# Patient Record
Sex: MALE | URBAN - METROPOLITAN AREA
[De-identification: names, ages, dates, MRNs, and addresses within clinical notes are randomized per-mention and may not be internally consistent; named-entity substitution may affect disease eponyms.]

---

## 2022-03-10 ENCOUNTER — APPOINTMENT (OUTPATIENT)
Dept: URBAN - METROPOLITAN AREA CLINIC 250 | Age: 82
Setting detail: DERMATOLOGY
End: 2022-03-13

## 2022-03-10 DIAGNOSIS — L28.1 PRURIGO NODULARIS: ICD-10-CM

## 2022-03-10 DIAGNOSIS — L21.8 OTHER SEBORRHEIC DERMATITIS: ICD-10-CM

## 2022-03-10 DIAGNOSIS — L82.0 INFLAMED SEBORRHEIC KERATOSIS: ICD-10-CM

## 2022-03-10 DIAGNOSIS — L29.8 OTHER PRURITUS: ICD-10-CM

## 2022-03-10 PROCEDURE — 17110 DESTRUCT B9 LESION 1-14: CPT

## 2022-03-10 PROCEDURE — OTHER PRESCRIPTION: OTHER

## 2022-03-10 PROCEDURE — OTHER PRESCRIPTION MEDICATION MANAGEMENT: OTHER

## 2022-03-10 PROCEDURE — OTHER MIPS QUALITY: OTHER

## 2022-03-10 PROCEDURE — 99214 OFFICE O/P EST MOD 30 MIN: CPT | Mod: 25

## 2022-03-10 PROCEDURE — OTHER COUNSELING: OTHER

## 2022-03-10 PROCEDURE — OTHER LIQUID NITROGEN: OTHER

## 2022-03-10 RX ORDER — TRIAMCINOLONE ACETONIDE 0.25 MG/G
CREAM TOPICAL
Qty: 80 | Refills: 1 | Status: ERX | COMMUNITY
Start: 2022-03-10

## 2022-03-10 RX ORDER — KETOCONAZOLE 20 MG/G
CREAM TOPICAL
Qty: 60 | Refills: 11 | Status: ERX | COMMUNITY
Start: 2022-03-10

## 2022-03-10 ASSESSMENT — LOCATION DETAILED DESCRIPTION DERM
LOCATION DETAILED: LEFT SUPERIOR PARIETAL SCALP
LOCATION DETAILED: LEFT INFERIOR UPPER BACK
LOCATION DETAILED: LEFT CENTRAL MALAR CHEEK
LOCATION DETAILED: RIGHT SUPERIOR LATERAL UPPER BACK
LOCATION DETAILED: LEFT SUPERIOR UPPER BACK
LOCATION DETAILED: RIGHT SUPERIOR UPPER BACK
LOCATION DETAILED: LEFT INFERIOR LATERAL MALAR CHEEK
LOCATION DETAILED: LEFT MID-UPPER BACK
LOCATION DETAILED: LEFT SUPERIOR LATERAL UPPER BACK
LOCATION DETAILED: RIGHT MEDIAL INFERIOR CHEST

## 2022-03-10 ASSESSMENT — LOCATION SIMPLE DESCRIPTION DERM
LOCATION SIMPLE: SCALP
LOCATION SIMPLE: LEFT CHEEK
LOCATION SIMPLE: LEFT UPPER BACK
LOCATION SIMPLE: CHEST
LOCATION SIMPLE: RIGHT UPPER BACK

## 2022-03-10 ASSESSMENT — LOCATION ZONE DERM
LOCATION ZONE: FACE
LOCATION ZONE: TRUNK
LOCATION ZONE: SCALP

## 2022-03-10 NOTE — PROCEDURE: PRESCRIPTION MEDICATION MANAGEMENT
Detail Level: Simple
Continue Regimen: Continue Triamcinolone 0.025% cream aaa bid PRN itch
Render In Strict Bullet Format?: No
Continue Regimen: Continue Triamcinolone 0.025% cream aaa bid PRN
Continue Regimen: Continue Ketoconazole cream 2% aaa bid

## 2022-03-10 NOTE — PROCEDURE: LIQUID NITROGEN
Show Spray Paint Technique Variable?: Yes
Spray Paint Text: The liquid nitrogen was applied to the skin utilizing a spray paint frosting technique.
Spray Paint Technique: No
Post-Care Instructions: I reviewed with the patient in detail post-care instructions. Patient is to wear sunprotection, and avoid picking at any of the treated lesions. Pt may apply Vaseline to crusted or scabbing areas.
Medical Necessity Information: It is in your best interest to select a reason for this procedure from the list below. All of these items fulfill various CMS LCD requirements except the new and changing color options.
Consent: The patient's consent was obtained including but not limited to risks of crusting, scabbing, blistering, scarring, darker or lighter pigmentary change, recurrence, incomplete removal and infection.
Number Of Freeze-Thaw Cycles: 1 freeze-thaw cycle
Duration Of Freeze Thaw-Cycle (Seconds): 5-10
Detail Level: Detailed
Medical Necessity Clause: This procedure was medically necessary because the lesions that were treated were:

## 2022-09-21 ENCOUNTER — APPOINTMENT (OUTPATIENT)
Dept: URBAN - METROPOLITAN AREA CLINIC 250 | Age: 82
Setting detail: DERMATOLOGY
End: 2022-09-21

## 2022-09-21 DIAGNOSIS — L29.8 OTHER PRURITUS: ICD-10-CM

## 2022-09-21 PROCEDURE — OTHER COUNSELING: OTHER

## 2022-09-21 PROCEDURE — OTHER PRESCRIPTION: OTHER

## 2022-09-21 PROCEDURE — OTHER PRESCRIPTION MEDICATION MANAGEMENT: OTHER

## 2022-09-21 PROCEDURE — OTHER MIPS QUALITY: OTHER

## 2022-09-21 PROCEDURE — 99213 OFFICE O/P EST LOW 20 MIN: CPT

## 2022-09-21 RX ORDER — NYSTATIN AND TRIAMCINOLONE ACETONIDE 100000; 1 [USP'U]/G; MG/G
1 OINTMENT TOPICAL TIW
Qty: 30 | Refills: 2 | Status: ERX | COMMUNITY
Start: 2022-09-21

## 2022-09-21 ASSESSMENT — LOCATION DETAILED DESCRIPTION DERM
LOCATION DETAILED: LEFT INFERIOR UPPER BACK
LOCATION DETAILED: GENITALIA

## 2022-09-21 ASSESSMENT — LOCATION SIMPLE DESCRIPTION DERM
LOCATION SIMPLE: LEFT UPPER BACK
LOCATION SIMPLE: GENITALIA

## 2022-09-21 ASSESSMENT — LOCATION ZONE DERM
LOCATION ZONE: TRUNK
LOCATION ZONE: GENITALIA

## 2022-09-21 NOTE — PROCEDURE: PRESCRIPTION MEDICATION MANAGEMENT
Discontinue Regimen: Continue Triamcinolone 0.025% cream aaa bid PRN itch
Detail Level: Simple
Initiate Treatment: nystatin-triamcinolone 100,000 unit/gram-0.1 % topical ointment TIW\\nQuantity: 60.0 g  Days Supply: 30\\nSig: Apply to affected areas of itching three times weekly.
Render In Strict Bullet Format?: No

## 2022-09-21 NOTE — PROCEDURE: MIPS QUALITY
Quality 111:Pneumonia Vaccination Status For Older Adults: Pneumococcal vaccine (PPSV23) administered on or after patient’s 60th birthday and before the end of the measurement period
Quality 110: Preventive Care And Screening: Influenza Immunization: Influenza Immunization previously received during influenza season
Detail Level: Detailed
Quality 226: Preventive Care And Screening: Tobacco Use: Screening And Cessation Intervention: Patient screened for tobacco use and is an ex/non-smoker
Quality 47: Advance Care Plan: Advance Care Planning discussed and documented; advance care plan or surrogate decision maker documented in the medical record.
Quality 130: Documentation Of Current Medications In The Medical Record: Current Medications Documented

## 2023-09-21 ENCOUNTER — APPOINTMENT (OUTPATIENT)
Dept: URBAN - METROPOLITAN AREA CLINIC 250 | Age: 83
Setting detail: DERMATOLOGY
End: 2023-09-23

## 2023-09-21 DIAGNOSIS — L29.8 OTHER PRURITUS: ICD-10-CM

## 2023-09-21 PROCEDURE — OTHER PRESCRIPTION: OTHER

## 2023-09-21 PROCEDURE — 99213 OFFICE O/P EST LOW 20 MIN: CPT

## 2023-09-21 PROCEDURE — OTHER PRESCRIPTION MEDICATION MANAGEMENT: OTHER

## 2023-09-21 PROCEDURE — OTHER COUNSELING: OTHER

## 2023-09-21 PROCEDURE — OTHER MIPS QUALITY: OTHER

## 2023-09-21 RX ORDER — NYSTATIN AND TRIAMCINOLONE ACETONIDE 100000; 1 [USP'U]/G; MG/G
1 OINTMENT TOPICAL TIW
Qty: 90 | Refills: 0 | Status: ERX | COMMUNITY
Start: 2023-09-21

## 2023-09-21 ASSESSMENT — LOCATION ZONE DERM
LOCATION ZONE: TRUNK
LOCATION ZONE: GENITALIA

## 2023-09-21 ASSESSMENT — LOCATION SIMPLE DESCRIPTION DERM
LOCATION SIMPLE: GENITALIA
LOCATION SIMPLE: LEFT UPPER BACK

## 2023-09-21 ASSESSMENT — LOCATION DETAILED DESCRIPTION DERM
LOCATION DETAILED: GENITALIA
LOCATION DETAILED: LEFT INFERIOR UPPER BACK

## 2023-09-21 NOTE — PROCEDURE: MIPS QUALITY
Quality 110: Preventive Care And Screening: Influenza Immunization: Influenza Immunization previously received during influenza season
Detail Level: Detailed
Quality 226: Preventive Care And Screening: Tobacco Use: Screening And Cessation Intervention: Patient screened for tobacco use and is an ex/non-smoker
Quality 47: Advance Care Plan: Advance Care Planning discussed and documented; advance care plan or surrogate decision maker documented in the medical record.
Quality 431: Preventive Care And Screening: Unhealthy Alcohol Use - Screening: Patient not identified as an unhealthy alcohol user when screened for unhealthy alcohol use using a systematic screening method
Quality 130: Documentation Of Current Medications In The Medical Record: Current Medications Documented

## 2023-09-21 NOTE — PROCEDURE: PRESCRIPTION MEDICATION MANAGEMENT
Detail Level: Simple
Continue Regimen: nystatin-triamcinolone 100,000 unit/gram-0.1 % topical ointment TIW\\nQuantity: 60.0 g  Days Supply: 30\\nSig: Apply to affected areas of itching three times weekly.
Render In Strict Bullet Format?: No

## 2023-10-02 ENCOUNTER — LAB REQUISITION (OUTPATIENT)
Dept: LAB | Age: 83
End: 2023-10-02

## 2023-10-02 DIAGNOSIS — R97.20 ELEVATED PROSTATE SPECIFIC ANTIGEN (PSA): ICD-10-CM

## 2023-10-02 PROCEDURE — 84153 ASSAY OF PSA TOTAL: CPT | Performed by: CLINICAL MEDICAL LABORATORY

## 2023-10-03 LAB — PSA SERPL-MCNC: 0.76 NG/ML

## 2023-12-07 ENCOUNTER — TELEPHONE (OUTPATIENT)
Dept: OTHER | Age: 83
End: 2023-12-07

## 2024-03-07 PROBLEM — I48.20 CHRONIC ATRIAL FIBRILLATION  (CMD): Status: ACTIVE | Noted: 2024-03-07

## 2024-03-07 PROBLEM — N52.9 ERECTILE DYSFUNCTION: Status: ACTIVE | Noted: 2024-03-07

## 2024-03-07 PROBLEM — K59.1 FUNCTIONAL DIARRHEA: Status: ACTIVE | Noted: 2024-03-07

## 2024-03-07 PROBLEM — Z85.46 HISTORY OF MALIGNANT NEOPLASM OF PROSTATE: Status: ACTIVE | Noted: 2024-03-07

## 2024-03-07 PROBLEM — K64.9 HEMORRHOIDS: Status: ACTIVE | Noted: 2024-03-07

## 2024-03-07 PROBLEM — I25.10 CORONARY ARTERIOSCLEROSIS: Status: ACTIVE | Noted: 2024-03-07

## 2024-03-07 PROBLEM — K21.9 GASTROESOPHAGEAL REFLUX DISEASE: Status: ACTIVE | Noted: 2024-03-07

## 2024-03-07 PROBLEM — I10 HYPERTENSIVE DISORDER: Status: ACTIVE | Noted: 2024-03-07

## 2024-03-07 PROBLEM — I87.2 CHRONIC VENOUS INSUFFICIENCY: Status: ACTIVE | Noted: 2023-02-07

## 2024-03-07 PROBLEM — J44.9 CHRONIC OBSTRUCTIVE PULMONARY DISEASE  (CMD): Status: ACTIVE | Noted: 2024-03-07

## 2024-03-07 PROBLEM — N18.31 STAGE 3A CHRONIC KIDNEY DISEASE  (CMD): Status: ACTIVE | Noted: 2024-03-07

## 2024-03-07 PROBLEM — I73.9 PERIPHERAL VASCULAR DISEASE (CMD): Status: ACTIVE | Noted: 2024-03-07

## 2024-03-07 PROBLEM — E79.0 HYPERURICEMIA: Status: ACTIVE | Noted: 2024-03-07

## 2024-03-07 PROBLEM — E78.5 HYPERLIPIDEMIA: Status: ACTIVE | Noted: 2024-03-07

## 2024-03-07 PROBLEM — R33.9 RETENTION OF URINE: Status: ACTIVE | Noted: 2024-03-07

## 2024-03-07 PROBLEM — E11.9 TYPE 2 DIABETES MELLITUS  (CMD): Status: ACTIVE | Noted: 2024-03-07

## 2024-05-06 ENCOUNTER — RX ONLY (RX ONLY)
Age: 84
End: 2024-05-06

## 2024-05-06 RX ORDER — NYSTATIN AND TRIAMCINOLONE ACETONIDE 100000; 1 [USP'U]/G; MG/G
1 OINTMENT TOPICAL TIW
Qty: 90 | Refills: 0 | Status: ERX

## 2024-06-28 ENCOUNTER — TELEPHONE (OUTPATIENT)
Dept: CARE COORDINATION | Age: 84
End: 2024-06-28

## 2024-07-05 ENCOUNTER — ADMINISTRATIVE DOCUMENTATION (OUTPATIENT)
Dept: POST ACUTE CARE | Age: 84
End: 2024-07-05

## 2024-07-05 DIAGNOSIS — D68.69 HYPERCOAGULABLE STATE DUE TO CHRONIC ATRIAL FIBRILLATION  (CMD): ICD-10-CM

## 2024-07-05 DIAGNOSIS — I48.20 HYPERCOAGULABLE STATE DUE TO CHRONIC ATRIAL FIBRILLATION  (CMD): ICD-10-CM

## 2024-07-05 DIAGNOSIS — E11.9 TYPE 2 DIABETES MELLITUS WITHOUT COMPLICATION, WITHOUT LONG-TERM CURRENT USE OF INSULIN  (CMD): Primary | ICD-10-CM

## 2024-07-05 PROBLEM — S50.311A ABRASION OF RIGHT ELBOW: Status: ACTIVE | Noted: 2023-07-13

## 2024-07-05 PROBLEM — E87.6 HYPOKALEMIA: Status: RESOLVED | Noted: 2024-07-05 | Resolved: 2024-07-05

## 2024-07-05 PROBLEM — S50.311A ABRASION OF RIGHT ELBOW: Status: RESOLVED | Noted: 2023-07-13 | Resolved: 2024-07-05

## 2024-07-05 PROBLEM — Z85.46 HISTORY OF PROSTATE CANCER: Status: RESOLVED | Noted: 2022-10-03 | Resolved: 2024-07-05

## 2024-07-05 PROBLEM — Z78.9 ON SUPPLEMENTAL OXYGEN BY NASAL CANNULA: Status: RESOLVED | Noted: 2023-06-20 | Resolved: 2024-07-05

## 2024-07-05 PROBLEM — I12.9 HYPERTENSIVE CHRONIC KIDNEY DISEASE W STG 1-4/UNSP CHR KDNY: Status: ACTIVE | Noted: 2021-08-03

## 2024-07-05 PROBLEM — Z85.46 HISTORY OF PROSTATE CANCER: Status: ACTIVE | Noted: 2022-10-03

## 2024-07-05 PROBLEM — I11.9 HYPERTENSIVE HEART DISEASE WITHOUT HEART FAILURE: Status: ACTIVE | Noted: 2024-03-07

## 2024-07-05 PROBLEM — E83.39 OTHER DISORDERS OF PHOSPHORUS METABOLISM: Status: RESOLVED | Noted: 2023-06-01 | Resolved: 2024-07-05

## 2024-07-05 PROBLEM — M25.521 RIGHT ELBOW PAIN: Status: RESOLVED | Noted: 2023-07-13 | Resolved: 2024-07-05

## 2024-07-05 PROBLEM — Z99.3 USES POWERED WHEELCHAIR: Status: ACTIVE | Noted: 2023-02-07

## 2024-07-05 PROBLEM — B37.89 PERIANAL CANDIDIASIS: Status: RESOLVED | Noted: 2024-07-05 | Resolved: 2024-07-05

## 2024-07-05 PROBLEM — I10 HYPERTENSIVE DISORDER: Status: RESOLVED | Noted: 2024-03-07 | Resolved: 2024-07-05

## 2024-07-05 PROBLEM — Z86.010 PERSONAL HISTORY OF COLONIC POLYPS: Status: RESOLVED | Noted: 2024-07-05 | Resolved: 2024-07-05

## 2024-07-05 PROBLEM — E87.6 HYPOKALEMIA: Status: ACTIVE | Noted: 2024-07-05

## 2024-07-05 PROBLEM — K63.5 COLON POLYPS: Status: ACTIVE | Noted: 2024-07-05

## 2024-07-05 PROBLEM — R14.0 ABDOMINAL DISTENSION (GASEOUS): Status: ACTIVE | Noted: 2024-07-05

## 2024-07-05 PROBLEM — L29.0 PERIANAL ITCH: Status: ACTIVE | Noted: 2024-07-05

## 2024-07-05 PROBLEM — K62.89 ANORECTAL PAIN: Status: ACTIVE | Noted: 2024-07-05

## 2024-07-05 PROBLEM — K59.00 CONSTIPATION: Status: RESOLVED | Noted: 2024-07-05 | Resolved: 2024-07-05

## 2024-07-05 PROBLEM — C61 PROSTATE CANCER  (CMD): Status: ACTIVE | Noted: 2022-10-03

## 2024-07-05 PROBLEM — R14.0 ABDOMINAL DISTENSION (GASEOUS): Status: RESOLVED | Noted: 2024-07-05 | Resolved: 2024-07-05

## 2024-07-05 PROBLEM — I11.0 HYPERTENSIVE HEART DISEASE WITH HEART FAILURE  (CMD): Status: ACTIVE | Noted: 2024-03-07

## 2024-07-05 PROBLEM — B37.89 PERIANAL CANDIDIASIS: Status: ACTIVE | Noted: 2024-07-05

## 2024-07-05 PROBLEM — K62.89 ANORECTAL PAIN: Status: RESOLVED | Noted: 2024-07-05 | Resolved: 2024-07-05

## 2024-07-05 PROBLEM — L29.0 PERIANAL ITCH: Status: RESOLVED | Noted: 2024-07-05 | Resolved: 2024-07-05

## 2024-07-05 PROBLEM — Z00.00 HEALTHCARE MAINTENANCE: Status: ACTIVE | Noted: 2024-07-05

## 2024-07-05 PROBLEM — Z78.9 ON SUPPLEMENTAL OXYGEN BY NASAL CANNULA: Status: ACTIVE | Noted: 2023-06-20

## 2024-07-05 PROBLEM — M25.511 ACUTE PAIN OF RIGHT SHOULDER: Status: ACTIVE | Noted: 2023-07-13

## 2024-07-05 PROBLEM — M25.521 RIGHT ELBOW PAIN: Status: ACTIVE | Noted: 2023-07-13

## 2024-07-05 PROBLEM — E83.39 OTHER DISORDERS OF PHOSPHORUS METABOLISM: Status: ACTIVE | Noted: 2023-06-01

## 2024-07-05 PROBLEM — Z86.0100 PERSONAL HISTORY OF COLONIC POLYPS: Status: ACTIVE | Noted: 2024-07-05

## 2024-07-05 PROBLEM — N18.30 BENIGN HYPERTENSIVE HEART AND KIDNEY DISEASE WITH CHRONIC KIDNEY DISEASE, STAGE III  (CMD): Status: ACTIVE | Noted: 2021-08-03

## 2024-07-05 PROBLEM — M25.511 ACUTE PAIN OF RIGHT SHOULDER: Status: RESOLVED | Noted: 2023-07-13 | Resolved: 2024-07-05

## 2024-07-05 PROBLEM — N18.31 HYPERTENSIVE HEART DISEASE WITH STAGE 3A CHRONIC KIDNEY DISEASE  (CMD): Status: ACTIVE | Noted: 2021-08-03

## 2024-07-05 PROBLEM — K63.5 COLON POLYPS: Status: RESOLVED | Noted: 2024-07-05 | Resolved: 2024-07-05

## 2024-07-05 PROBLEM — I13.10 BENIGN HYPERTENSIVE HEART AND KIDNEY DISEASE WITH CHRONIC KIDNEY DISEASE, STAGE III  (CMD): Status: ACTIVE | Noted: 2021-08-03

## 2024-07-05 PROBLEM — K59.00 CONSTIPATION: Status: ACTIVE | Noted: 2024-07-05

## 2024-07-05 PROBLEM — Z86.0100 PERSONAL HISTORY OF COLONIC POLYPS: Status: RESOLVED | Noted: 2024-07-05 | Resolved: 2024-07-05

## 2024-07-05 PROBLEM — Z86.010 PERSONAL HISTORY OF COLONIC POLYPS: Status: ACTIVE | Noted: 2024-07-05

## 2024-07-11 ENCOUNTER — HOME/GROUP HOME VISIT (OUTPATIENT)
Dept: POST ACUTE CARE | Age: 84
End: 2024-07-11

## 2024-07-11 VITALS
DIASTOLIC BLOOD PRESSURE: 72 MMHG | OXYGEN SATURATION: 94 % | WEIGHT: 240 LBS | HEIGHT: 76 IN | HEART RATE: 72 BPM | TEMPERATURE: 98.8 F | RESPIRATION RATE: 14 BRPM | SYSTOLIC BLOOD PRESSURE: 124 MMHG | BODY MASS INDEX: 29.22 KG/M2

## 2024-07-11 DIAGNOSIS — I13.10 HYPERTENSIVE HEART AND KIDNEY DISEASE WITHOUT HEART FAILURE AND WITH STAGE 3A CHRONIC KIDNEY DISEASE  (CMD): ICD-10-CM

## 2024-07-11 DIAGNOSIS — Z00.00 HEALTHCARE MAINTENANCE: Primary | ICD-10-CM

## 2024-07-11 DIAGNOSIS — I48.20 HYPERCOAGULABLE STATE DUE TO CHRONIC ATRIAL FIBRILLATION  (CMD): ICD-10-CM

## 2024-07-11 DIAGNOSIS — N18.31 HYPERTENSIVE HEART AND KIDNEY DISEASE WITHOUT HEART FAILURE AND WITH STAGE 3A CHRONIC KIDNEY DISEASE  (CMD): ICD-10-CM

## 2024-07-11 DIAGNOSIS — I73.9 PERIPHERAL VASCULAR DISEASE (CMD): ICD-10-CM

## 2024-07-11 DIAGNOSIS — E78.49 OTHER HYPERLIPIDEMIA: ICD-10-CM

## 2024-07-11 DIAGNOSIS — Z99.81 DEPENDENCE ON SUPPLEMENTAL OXYGEN: ICD-10-CM

## 2024-07-11 DIAGNOSIS — D68.69 HYPERCOAGULABLE STATE DUE TO CHRONIC ATRIAL FIBRILLATION  (CMD): ICD-10-CM

## 2024-07-11 DIAGNOSIS — J44.9 CHRONIC OBSTRUCTIVE PULMONARY DISEASE, UNSPECIFIED COPD TYPE  (CMD): ICD-10-CM

## 2024-07-11 DIAGNOSIS — I11.9 HYPERTENSIVE HEART DISEASE WITHOUT HEART FAILURE: ICD-10-CM

## 2024-07-11 DIAGNOSIS — I48.20 CHRONIC ATRIAL FIBRILLATION  (CMD): ICD-10-CM

## 2024-07-11 DIAGNOSIS — R54 FRAILTY: ICD-10-CM

## 2024-07-11 DIAGNOSIS — E11.9 TYPE 2 DIABETES MELLITUS WITHOUT COMPLICATION, WITHOUT LONG-TERM CURRENT USE OF INSULIN  (CMD): ICD-10-CM

## 2024-07-11 DIAGNOSIS — Z91.81 AT HIGH RISK FOR FALLS: ICD-10-CM

## 2024-07-11 LAB — HBA1C MFR BLD: 4.9 % (ref 4.5–5.6)

## 2024-07-11 PROCEDURE — 1160F RVW MEDS BY RX/DR IN RCRD: CPT | Performed by: NURSE PRACTITIONER

## 2024-07-11 PROCEDURE — 3078F DIAST BP <80 MM HG: CPT | Performed by: NURSE PRACTITIONER

## 2024-07-11 PROCEDURE — 83036 HEMOGLOBIN GLYCOSYLATED A1C: CPT | Performed by: NURSE PRACTITIONER

## 2024-07-11 PROCEDURE — 3044F HG A1C LEVEL LT 7.0%: CPT | Performed by: NURSE PRACTITIONER

## 2024-07-11 PROCEDURE — 3074F SYST BP LT 130 MM HG: CPT | Performed by: NURSE PRACTITIONER

## 2024-07-11 PROCEDURE — 1126F AMNT PAIN NOTED NONE PRSNT: CPT | Performed by: NURSE PRACTITIONER

## 2024-07-11 PROCEDURE — 1158F ADVNC CARE PLAN TLK DOCD: CPT | Performed by: NURSE PRACTITIONER

## 2024-07-11 PROCEDURE — G9920 SCRNING PERF AND NEGATIVE: HCPCS | Performed by: NURSE PRACTITIONER

## 2024-07-11 PROCEDURE — 99497 ADVNCD CARE PLAN 30 MIN: CPT | Performed by: NURSE PRACTITIONER

## 2024-07-11 PROCEDURE — 99345 HOME/RES VST NEW HIGH MDM 75: CPT | Performed by: NURSE PRACTITIONER

## 2024-07-11 RX ORDER — IPRATROPIUM BROMIDE AND ALBUTEROL 20; 100 UG/1; UG/1
1 SPRAY, METERED RESPIRATORY (INHALATION)
COMMUNITY

## 2024-07-11 RX ORDER — NAPROXEN 500 MG/1
500 TABLET ORAL 2 TIMES DAILY WITH MEALS
COMMUNITY
End: 2024-07-15 | Stop reason: ALTCHOICE

## 2024-07-11 RX ORDER — PREDNISOLONE ACETATE 10 MG/ML
1 SUSPENSION/ DROPS OPHTHALMIC
COMMUNITY
Start: 2024-01-17

## 2024-07-11 RX ORDER — MOMETASONE FUROATE 1 MG/ML
SOLUTION TOPICAL DAILY
COMMUNITY

## 2024-07-11 RX ORDER — TRIAMCINOLONE ACETONIDE 0.25 MG/G
CREAM TOPICAL 2 TIMES DAILY
COMMUNITY
Start: 2024-05-06

## 2024-07-11 RX ORDER — KETOCONAZOLE 20 MG/G
CREAM TOPICAL
COMMUNITY
Start: 2024-05-05

## 2024-07-11 RX ORDER — AMIODARONE HYDROCHLORIDE 200 MG/1
200 TABLET ORAL DAILY
COMMUNITY

## 2024-07-11 RX ORDER — KETOROLAC TROMETHAMINE 5 MG/ML
1 SOLUTION OPHTHALMIC 4 TIMES DAILY
COMMUNITY
Start: 2024-01-17

## 2024-07-11 RX ORDER — TRIAMTERENE AND HYDROCHLOROTHIAZIDE 37.5; 25 MG/1; MG/1
CAPSULE ORAL DAILY
COMMUNITY

## 2024-07-11 RX ORDER — POTASSIUM CHLORIDE 600 MG/1
TABLET, FILM COATED, EXTENDED RELEASE ORAL 2 TIMES DAILY
COMMUNITY

## 2024-07-11 RX ORDER — SUCRALFATE ORAL 1 G/10ML
1 SUSPENSION ORAL
COMMUNITY

## 2024-07-11 RX ORDER — HYDROCORTISONE 25 MG/G
CREAM TOPICAL
COMMUNITY

## 2024-07-11 SDOH — HEALTH STABILITY: PHYSICAL HEALTH: ON AVERAGE, HOW MANY MINUTES DO YOU ENGAGE IN EXERCISE AT THIS LEVEL?: 40 MIN

## 2024-07-11 SDOH — HEALTH STABILITY: MENTAL HEALTH: DEPRESSION SCREENING SCORE: 0

## 2024-07-11 SDOH — SOCIAL STABILITY: SOCIAL INSECURITY: HOW OFTEN DOES ANYONE, INCLUDING FAMILY AND FRIENDS, THREATEN YOU WITH HARM?: NEVER

## 2024-07-11 SDOH — ECONOMIC STABILITY: INCOME INSECURITY: IN THE PAST 12 MONTHS, HAS THE ELECTRIC, GAS, OIL, OR WATER COMPANY THREATENED TO SHUT OFF SERVICE IN YOUR HOME?: NO

## 2024-07-11 SDOH — ECONOMIC STABILITY: HOUSING INSECURITY: WHAT IS YOUR LIVING SITUATION TODAY?: I HAVE A STEADY PLACE TO LIVE

## 2024-07-11 SDOH — HEALTH STABILITY: MENTAL HEALTH: HOW MANY STANDARD DRINKS CONTAINING ALCOHOL DO YOU HAVE ON A TYPICAL DAY?: 0,1 OR 2

## 2024-07-11 SDOH — HEALTH STABILITY: MENTAL HEALTH: HOW OFTEN DO YOU HAVE A DRINK CONTAINING ALCOHOL?: NEVER

## 2024-07-11 SDOH — ECONOMIC STABILITY: TRANSPORTATION INSECURITY
IN THE PAST 12 MONTHS, HAS LACK OF RELIABLE TRANSPORTATION KEPT YOU FROM MEDICAL APPOINTMENTS, MEETINGS, WORK OR FROM GETTING THINGS NEEDED FOR DAILY LIVING?: NO

## 2024-07-11 SDOH — SOCIAL STABILITY: SOCIAL INSECURITY: HOW OFTEN DOES ANYONE, INCLUDING FAMILY AND FRIENDS, SCREAM OR CURSE AT YOU?: NEVER

## 2024-07-11 SDOH — HEALTH STABILITY: MENTAL HEALTH: HOW OFTEN DO YOU HAVE 6 OR MORE DRINKS ON ONE OCCASION?: NEVER

## 2024-07-11 SDOH — HEALTH STABILITY: MENTAL HEALTH: AUDIT TOTAL SCORE: 0

## 2024-07-11 SDOH — HEALTH STABILITY: PHYSICAL HEALTH: ON AVERAGE, HOW MANY DAYS PER WEEK DO YOU ENGAGE IN MODERATE TO STRENUOUS EXERCISE (LIKE A BRISK WALK)?: 4 DAYS

## 2024-07-11 SDOH — ECONOMIC STABILITY: GENERAL

## 2024-07-11 SDOH — SOCIAL STABILITY: SOCIAL INSECURITY: HOW OFTEN DOES ANYONE, INCLUDING FAMILY AND FRIENDS, INSULT OR TALK DOWN TO YOU?: NEVER

## 2024-07-11 SDOH — ECONOMIC STABILITY: FOOD INSECURITY: WITHIN THE PAST 12 MONTHS, THE FOOD YOU BOUGHT JUST DIDN'T LAST AND YOU DIDN'T HAVE MONEY TO GET MORE.: NEVER TRUE

## 2024-07-11 SDOH — HEALTH STABILITY: MENTAL HEALTH: PHQ2 INTERPRETATION: NO FURTHER SCREENING NEEDED

## 2024-07-11 SDOH — HEALTH STABILITY: MENTAL HEALTH: FEELING DOWN, DEPRESSED OR HOPELESS?: NOT AT ALL

## 2024-07-11 SDOH — SOCIAL STABILITY: SOCIAL NETWORK
HOW OFTEN DO YOU SEE OR TALK TO PEOPLE THAT YOU CARE ABOUT AND FEEL CLOSE TO? (FOR EXAMPLE: TALKING TO FRIENDS ON THE PHONE, VISITING FRIENDS OR FAMILY, GOING TO CHURCH OR CLUB MEETINGS): 5 OR MORE TIMES A WEEK

## 2024-07-11 SDOH — ECONOMIC STABILITY: HOUSING INSECURITY: DO YOU HAVE PROBLEMS WITH ANY OF THE FOLLOWING?: NONE OF THE ABOVE

## 2024-07-11 SDOH — SOCIAL STABILITY: SOCIAL INSECURITY: HOW OFTEN DOES ANYONE, INCLUDING FAMILY AND FRIENDS, PHYSICALLY HURT YOU?: NEVER

## 2024-07-11 SDOH — HEALTH STABILITY: MENTAL HEALTH: LITTLE INTEREST OR PLEASURE IN ACTIVITY?: NOT AT ALL

## 2024-07-11 ASSESSMENT — FRAILTY ASSESSMENTS
DO YOU HAVE DIFFICULTY CLIMBING A FLIGHT OF STAIRS: YES
DO YOU HAVE DIFFICULTY WALKING ONE BLOCK: NO
FRAILTY SCALE TOTAL SCORE: 3
STEADI FALL SCORE: 4
DO YOU HAVE DIFFICULTY WALKING ONE BLOCK: YES
HAVE YOU LOST MORE THAN 5 PERCENT OF YOUR WEIGHT IN THE PAST YEAR: NO
DO YOU HAVE ANY OF THESE ILLNESSES: HYPERTENSION, DIABETES, CANCER (OTHER THAN A MINOR SKIN CANCER), CHRONIC LUNG DISEASE, HEART ATTACK, CONGESTIVE HEART FAILURE, ANGINA, ASTHMA, ARTHRITIS, STROKE, AND KIDNEY DISEASE: YES
I TAKE MEDICINE TO HELP ME SLEEP OR IMPROVE MY MOOD: NO

## 2024-07-11 ASSESSMENT — PATIENT HEALTH QUESTIONNAIRE - PHQ9: SUM OF ALL RESPONSES TO PHQ9 QUESTIONS 1 AND 2: 0

## 2024-07-11 ASSESSMENT — SOCIAL DETERMINANTS OF HEALTH (SDOH): IN THE PAST 12 MONTHS, HAS THE ELECTRIC, GAS, OIL, OR WATER COMPANY THREATENED TO SHUT OFF SERVICE IN YOUR HOME?: NO

## 2024-07-11 ASSESSMENT — PAIN SCALES - GENERAL: PAINLEVEL: 0

## 2024-07-15 PROBLEM — Z91.81 AT HIGH RISK FOR FALLS: Status: ACTIVE | Noted: 2024-07-15

## 2024-07-15 PROBLEM — R54 FRAILTY: Status: ACTIVE | Noted: 2024-07-15

## 2024-07-15 PROBLEM — Z99.81 DEPENDENCE ON SUPPLEMENTAL OXYGEN: Status: ACTIVE | Noted: 2024-07-15

## 2024-07-15 ASSESSMENT — ENCOUNTER SYMPTOMS
HEMATOLOGIC/LYMPHATIC NEGATIVE: 1
CHEST TIGHTNESS: 0
ALLERGIC/IMMUNOLOGIC NEGATIVE: 1
COUGH: 0
ENDOCRINE NEGATIVE: 1
SHORTNESS OF BREATH: 0
DIZZINESS: 0
PSYCHIATRIC NEGATIVE: 1
EYES NEGATIVE: 1
CONSTITUTIONAL NEGATIVE: 1
GASTROINTESTINAL NEGATIVE: 1
RESPIRATORY NEGATIVE: 1

## 2024-07-16 ENCOUNTER — ADVANCED DIRECTIVES (OUTPATIENT)
Dept: HEALTH INFORMATION MANAGEMENT | Facility: OTHER | Age: 84
End: 2024-07-16

## 2024-08-27 ENCOUNTER — RX ONLY (RX ONLY)
Age: 84
End: 2024-08-27

## 2024-08-27 RX ORDER — NYSTATIN AND TRIAMCINOLONE ACETONIDE 100000; 1 [USP'U]/G; MG/G
1 CREAM TOPICAL TIW
Qty: 90 | Refills: 0 | Status: ERX | COMMUNITY
Start: 2024-08-26

## 2024-09-30 ENCOUNTER — APPOINTMENT (OUTPATIENT)
Age: 84
Setting detail: DERMATOLOGY
End: 2024-10-01

## 2024-09-30 DIAGNOSIS — L21.8 OTHER SEBORRHEIC DERMATITIS: ICD-10-CM

## 2024-09-30 DIAGNOSIS — L29.89 OTHER PRURITUS: ICD-10-CM

## 2024-09-30 DIAGNOSIS — T07XXXA ABRASION OR FRICTION BURN OF OTHER, MULTIPLE, AND UNSPECIFIED SITES, WITHOUT MENTION OF INFECTION: ICD-10-CM

## 2024-09-30 PROBLEM — S00.01XA ABRASION OF SCALP, INITIAL ENCOUNTER: Status: ACTIVE | Noted: 2024-09-30

## 2024-09-30 PROBLEM — L29.8 OTHER PRURITUS: Status: ACTIVE | Noted: 2024-09-30

## 2024-09-30 PROCEDURE — OTHER COUNSELING: OTHER

## 2024-09-30 PROCEDURE — OTHER PRESCRIPTION: OTHER

## 2024-09-30 PROCEDURE — OTHER MIPS QUALITY: OTHER

## 2024-09-30 PROCEDURE — OTHER PRESCRIPTION MEDICATION MANAGEMENT: OTHER

## 2024-09-30 PROCEDURE — 99214 OFFICE O/P EST MOD 30 MIN: CPT

## 2024-09-30 RX ORDER — KETOCONAZOLE 20 MG/G
CREAM TOPICAL
Qty: 60 | Refills: 11 | Status: ERX

## 2024-09-30 RX ORDER — TRIAMCINOLONE ACETONIDE 0.25 MG/G
OINTMENT TOPICAL BID
Qty: 45 | Refills: 0 | Status: ERX | COMMUNITY
Start: 2024-09-30

## 2024-09-30 RX ORDER — NYSTATIN AND TRIAMCINOLONE ACETONIDE 100000; 1 [USP'U]/G; MG/G
1 OINTMENT TOPICAL TIW
Qty: 90 | Refills: 0 | Status: ERX

## 2024-09-30 ASSESSMENT — LOCATION ZONE DERM
LOCATION ZONE: FACE
LOCATION ZONE: GENITALIA
LOCATION ZONE: TRUNK
LOCATION ZONE: SCALP

## 2024-09-30 ASSESSMENT — LOCATION SIMPLE DESCRIPTION DERM
LOCATION SIMPLE: POSTERIOR SCALP
LOCATION SIMPLE: LEFT UPPER BACK
LOCATION SIMPLE: GENITALIA
LOCATION SIMPLE: CHEST
LOCATION SIMPLE: LEFT CHEEK

## 2024-09-30 ASSESSMENT — LOCATION DETAILED DESCRIPTION DERM
LOCATION DETAILED: RIGHT MEDIAL INFERIOR CHEST
LOCATION DETAILED: LEFT INFERIOR LATERAL MALAR CHEEK
LOCATION DETAILED: GENITALIA
LOCATION DETAILED: RIGHT INFERIOR OCCIPITAL SCALP
LOCATION DETAILED: LEFT CENTRAL MALAR CHEEK
LOCATION DETAILED: LEFT INFERIOR UPPER BACK

## 2024-09-30 NOTE — PROCEDURE: PRESCRIPTION MEDICATION MANAGEMENT
Detail Level: Simple
Continue Regimen: nystatin-triamcinolone 100,000 unit/gram-0.1 % topical ointment TIW\\nQuantity: 60.0 g  Days Supply: 30\\nSig: Apply to affected areas of itching three times weekly.
Render In Strict Bullet Format?: No
Continue Regimen: ketoconazole 2 % topical cream \\nQuantity: 60.0 g  Days Supply: 30\\nSig: Apply twice a day to affected areas on face and scalp and chest\\n\\ntriamcinolone acetonide 0.025 % topical ointment BID\\nQuantity: 45.0 g  Days Supply: 30\\nSig: Apply to the affected areas twice daily 3 x a week as needed.
Detail Level: Zone
Samples Given: Aquaphor ointment to apply to the affected area once daily until healed

## 2024-10-07 ENCOUNTER — TELEPHONE (OUTPATIENT)
Dept: FAMILY MEDICINE | Age: 84
End: 2024-10-07

## 2024-11-22 ENCOUNTER — RX ONLY (RX ONLY)
Age: 84
End: 2024-11-22

## 2024-11-22 RX ORDER — KETOCONAZOLE 20 MG/G
1 CREAM TOPICAL BID
Qty: 180 | Refills: 4 | Status: ERX

## 2024-12-09 ENCOUNTER — LAB REQUISITION (OUTPATIENT)
Dept: LAB | Age: 84
End: 2024-12-09

## 2024-12-09 DIAGNOSIS — R97.20 ELEVATED PROSTATE SPECIFIC ANTIGEN (PSA): ICD-10-CM

## 2024-12-09 PROCEDURE — 84153 ASSAY OF PSA TOTAL: CPT | Performed by: CLINICAL MEDICAL LABORATORY

## 2024-12-10 LAB — PSA SERPL-MCNC: 0.95 NG/ML
